# Patient Record
(demographics unavailable — no encounter records)

---

## 2018-03-29 NOTE — DIAGNOSTIC IMAGING REPORT
PROCEDURE:TESTICULAR DOPPLER ULTRASOUND

COMPARISON:None.

INDICATIONS:LEFT UPPER TESTICULAR MASS

TECHNIQUE: Gray-scale and color doppler images of the testicles and 

scrotal contents were obtained. Duplex imaging with spectral waveform 

analysis was performed of the testicular arteries and veins. 

 

FINDINGS:

 

Please see testicular ultrasound from the same day for combined 

dictation.

 

CONCLUSION:

Please see testicular ultrasound from the same day for combined 

dictation.

 

Dictated by:  Kuldeep Baez M.D. on 3/29/2018 at 16:59     

Electronically approved by:  Kuldeep Baez M.D. on 3/29/2018 at 16:59

## 2018-03-29 NOTE — DIAGNOSTIC IMAGING REPORT
PROCEDURE:TESTICULAR ULTRASOUND and DUPLEX ULTRASOUND

COMPARISON:None.

INDICATIONS:LEFT UPPER TESTICULAR MASS

TECHNIQUE: Gray-scale and color doppler images of the testicles and 

scrotal contents were obtained. Duplex imaging with spectral waveform 

analysis was performed of the testicular arteries and veins. 

 

FINDINGS:

 

RIGHT SCROTUM:

Testicle: 4.3 x 2.9 x 4.2 cm.

Epididymal head: 0.9 x 1.5 x 1.8 cm. 1.1 x 0.5 x 0.9 cm and 0.9 x 0.7 x 

0.8 cm right epididymal head cyst.

Hydrocele: None.

Varicocele: None.

 

LEFT SCROTUM:

Testicle: 3.4 x 3.6 x 4.4 cm.

Epididymal head: 2.9 x 2.4 x 4.2 cm. 2.5 x 2.3 x 2.7 cm left epididymal 

head cysts/spermatocele, previously 2.0 x 1.5 x 2.6 cm. 2.1 x 1.8 x 2.1 

cm left epididymal head cyst or spermatocele. 1.3 x 0.8 x 1.6 cm left 

epididymal body cyst/spermatocele.

Hydrocele: None.

Varicocele: None.



 

CONCLUSION:

Bilateral epididymal cysts and spermatoceles. Otherwise unremarkable 

ultrasound.

 

Dictated by:  Kuldeep Baez M.D. on 3/29/2018 at 16:58     

Electronically approved by:  Kuldeep Baez M.D. on 3/29/2018 at 16:58

## 2020-03-10 NOTE — DIAGNOSTIC IMAGING REPORT
CT of the abdomen and pelvis, without and with contrast.    



History: Microscopic hematuria.



Comparison: None available.



Technique: Multidetector CT scanning of the abdomen and pelvis was performed

from the level of the lung bases to the inferior pubic rami before and after

intravenous administration of contrast according to the CT or protocol. 

Coronal, sagittal, and three-dimensional reformations were obtained.



RADIATION DOSE:

     Total DLP: 1146.14 mGy*cm

     Dose modulation, iterative reconstruction, and/or weight based adjustment

of the mA/kV was utilized to reduce the radiation dose to as low as reasonably

achievable. 



FINDINGS:

The visualized lung bases demonstrate no significant abnormalities. The imaged

portion of the heart is unremarkable.



The liver is normal in size and attenuation. A subcentimeter hypodensity is

identified within the right hepatic lobe which is too small to definitively

characterize. No other focal hepatic abnormality is identified. The gallbladder

is unremarkable. There is no biliary ductal dilatation. There is a small hiatal

hernia present. The stomach is otherwise unremarkable. The spleen, pancreas,

and bilateral adrenal glands are unremarkable.



The kidneys are normal in size and location and symmetrically enhance/excrete

contrast material. There is no evidence for nephrolithiasis. There is a

exophytic cyst identified arising off the inferior pole of the left kidney

measuring up to 1.4 cm. There are additional subcentimeter hypodensities

identified bilaterally which are to small to definitively characterize but

likely represent cysts. There is no evidence for solid/enhancing mass. There is

a single collecting system and ureter identified bilaterally. There is no

evidence of a filling defect or other abnormality. The urinary bladder is

unremarkable. The prostate demonstrates no significant abnormalities.



The abdominal aorta is normal course and caliber with mild atherosclerotic

calcifications. The IVC is unremarkable.



The visualized loops of small large bowel demonstrate no evidence of

obstruction or inflammation. There is no ascites or intraperitoneal free air.

No abnormally enlarged lymph nodes are identified within the abdomen or pelvis.

There is a small fat-containing umbilical hernia present. Small bilateral

fat-containing inguinal hernias noted.



The osseous structures demonstrate no evidence for acute fracture or

destructive process. The extraperitoneal soft tissues are unremarkable.





IMPRESSION:



Unremarkable CT urogram without evidence for nephrolithiasis, hydronephrosis,

solid renal mass, or other ureteral filling defect/abnormality.



Small hiatal hernia.



Signed by: Dr. Darren Mcpherson MD on 3/10/2020 5:10 PM